# Patient Record
Sex: FEMALE
[De-identification: names, ages, dates, MRNs, and addresses within clinical notes are randomized per-mention and may not be internally consistent; named-entity substitution may affect disease eponyms.]

---

## 2018-06-28 ENCOUNTER — HOSPITAL ENCOUNTER (OUTPATIENT)
Dept: HOSPITAL 5 - SPVWC | Age: 58
Discharge: HOME | End: 2018-06-28
Attending: INTERNAL MEDICINE
Payer: MEDICARE

## 2018-06-28 DIAGNOSIS — Z78.0: ICD-10-CM

## 2018-06-28 DIAGNOSIS — M85.80: Primary | ICD-10-CM

## 2018-06-28 DIAGNOSIS — F17.210: ICD-10-CM

## 2018-06-28 PROCEDURE — 77080 DXA BONE DENSITY AXIAL: CPT

## 2018-06-30 NOTE — MAMMOGRAPHY REPORT
BONE DEXA:06/28/18 14:27:00



CLINICAL: Postmenopausal. No comparison.



TECHNIQUE: Two site bone DEXA performed on an Hologic scanner.





FINDINGS:

The average BMD of the lumbar spine L1-L4 is 0.984g/cm squared with a 

T-score of -0.6 and a Z-score of +0.6.



The average BMD of the left hip is 0.725g/cm squared with a T-score of 

-1.8 and a Z-score of is 1.0.





IMPRESSION:

1. WHO classification: Normal with average fracture risk  based on 

lumbar spine measurements.



2. WHO classification: Osteopenia with increased fracture risk  based 

on left hip measurements.





RECOMMENDATION: Clinical correlation and routine screening.





DEFINITIONS:

  BMD     = Bone Mineral Density

  T-score = BMD related to mean peak bone mass of young adult

            (mean expressed in Standard Deviation)

  Z-score = Age matched BMD expressed in SD



World Health Organization (WHO) Diagnostic Criteria

  Normal             T-score > -1 SD

  Osteopenia      T-score between -1 and -2.4 SD

  Osteoporosis    T-score -2.5 SD or below



NOTE:

      BMD is not the only risk factor for fracture. One should also 

consider factors such as the patient's age, risk of falling, previous 

osteoporotic fracture, family history of osteoporotic fractures, 

current smoker, and low body weight.

Z-scores are not calculated if >80 years of age.